# Patient Record
Sex: FEMALE | Race: OTHER | NOT HISPANIC OR LATINO | URBAN - METROPOLITAN AREA
[De-identification: names, ages, dates, MRNs, and addresses within clinical notes are randomized per-mention and may not be internally consistent; named-entity substitution may affect disease eponyms.]

---

## 2018-06-13 ENCOUNTER — EMERGENCY (EMERGENCY)
Facility: HOSPITAL | Age: 43
LOS: 1 days | Discharge: HOME | End: 2018-06-13
Attending: EMERGENCY MEDICINE | Admitting: EMERGENCY MEDICINE
Payer: MEDICAID

## 2018-06-13 VITALS
DIASTOLIC BLOOD PRESSURE: 81 MMHG | RESPIRATION RATE: 18 BRPM | OXYGEN SATURATION: 99 % | TEMPERATURE: 98 F | HEART RATE: 93 BPM | SYSTOLIC BLOOD PRESSURE: 115 MMHG

## 2018-06-13 VITALS
OXYGEN SATURATION: 100 % | DIASTOLIC BLOOD PRESSURE: 76 MMHG | SYSTOLIC BLOOD PRESSURE: 106 MMHG | HEART RATE: 82 BPM | RESPIRATION RATE: 16 BRPM

## 2018-06-13 LAB
ALBUMIN SERPL ELPH-MCNC: 4.1 G/DL — SIGNIFICANT CHANGE UP (ref 3.3–5)
ALP SERPL-CCNC: 39 U/L — LOW (ref 40–120)
ALT FLD-CCNC: 14 U/L — SIGNIFICANT CHANGE UP (ref 4–33)
APTT BLD: 27.3 SEC — LOW (ref 27.5–37.4)
AST SERPL-CCNC: 20 U/L — SIGNIFICANT CHANGE UP (ref 4–32)
BILIRUB SERPL-MCNC: < 0.2 MG/DL — LOW (ref 0.2–1.2)
BUN SERPL-MCNC: 10 MG/DL — SIGNIFICANT CHANGE UP (ref 7–23)
CALCIUM SERPL-MCNC: 9.1 MG/DL — SIGNIFICANT CHANGE UP (ref 8.4–10.5)
CHLORIDE SERPL-SCNC: 101 MMOL/L — SIGNIFICANT CHANGE UP (ref 98–107)
CK MB BLD-MCNC: 1.22 NG/ML — SIGNIFICANT CHANGE UP (ref 1–4.7)
CK MB BLD-MCNC: SIGNIFICANT CHANGE UP (ref 0–2.5)
CK SERPL-CCNC: 72 U/L — SIGNIFICANT CHANGE UP (ref 25–170)
CO2 SERPL-SCNC: 25 MMOL/L — SIGNIFICANT CHANGE UP (ref 22–31)
CREAT SERPL-MCNC: 0.82 MG/DL — SIGNIFICANT CHANGE UP (ref 0.5–1.3)
GLUCOSE SERPL-MCNC: 94 MG/DL — SIGNIFICANT CHANGE UP (ref 70–99)
HCG SERPL-ACNC: < 5 MIU/ML — SIGNIFICANT CHANGE UP
HCT VFR BLD CALC: 38.3 % — SIGNIFICANT CHANGE UP (ref 34.5–45)
HGB BLD-MCNC: 12.5 G/DL — SIGNIFICANT CHANGE UP (ref 11.5–15.5)
INR BLD: 0.93 — SIGNIFICANT CHANGE UP (ref 0.88–1.17)
MCHC RBC-ENTMCNC: 27.9 PG — SIGNIFICANT CHANGE UP (ref 27–34)
MCHC RBC-ENTMCNC: 32.6 % — SIGNIFICANT CHANGE UP (ref 32–36)
MCV RBC AUTO: 85.5 FL — SIGNIFICANT CHANGE UP (ref 80–100)
NRBC # FLD: 0 — SIGNIFICANT CHANGE UP
PLATELET # BLD AUTO: 175 K/UL — SIGNIFICANT CHANGE UP (ref 150–400)
PMV BLD: 11.1 FL — SIGNIFICANT CHANGE UP (ref 7–13)
POTASSIUM SERPL-MCNC: 3.7 MMOL/L — SIGNIFICANT CHANGE UP (ref 3.5–5.3)
POTASSIUM SERPL-SCNC: 3.7 MMOL/L — SIGNIFICANT CHANGE UP (ref 3.5–5.3)
PROT SERPL-MCNC: 7 G/DL — SIGNIFICANT CHANGE UP (ref 6–8.3)
PROTHROM AB SERPL-ACNC: 10.7 SEC — SIGNIFICANT CHANGE UP (ref 9.8–13.1)
RBC # BLD: 4.48 M/UL — SIGNIFICANT CHANGE UP (ref 3.8–5.2)
RBC # FLD: 12.5 % — SIGNIFICANT CHANGE UP (ref 10.3–14.5)
SODIUM SERPL-SCNC: 139 MMOL/L — SIGNIFICANT CHANGE UP (ref 135–145)
TROPONIN T, HIGH SENSITIVITY RESULT: < 6 NG/L — SIGNIFICANT CHANGE UP (ref ?–14)
WBC # BLD: 8.67 K/UL — SIGNIFICANT CHANGE UP (ref 3.8–10.5)
WBC # FLD AUTO: 8.67 K/UL — SIGNIFICANT CHANGE UP (ref 3.8–10.5)

## 2018-06-13 PROCEDURE — 93010 ELECTROCARDIOGRAM REPORT: CPT

## 2018-06-13 PROCEDURE — 71046 X-RAY EXAM CHEST 2 VIEWS: CPT | Mod: 26

## 2018-06-13 PROCEDURE — 99285 EMERGENCY DEPT VISIT HI MDM: CPT | Mod: 25

## 2018-06-13 NOTE — ED PROVIDER NOTE - OBJECTIVE STATEMENT
42 yo female w/o pmhx c/o chest pain and sob. per the patient she has been having persistent sob and chest pain over the past 4 days. the patient states that she has been having sob w/ dyspnea on exertion over the past 4 days. along with that she has chest pain, left sided and left scapular, described as pressure like in sensation. states that the pain gets worse with ambulation and better with rest. denies any cough, rhinorrhea, nasal congestion, n/v/d, headache. does have dizziness, w/ a room spinning sensation after prolonged standing. No abdominal pain. No hx of early MI in her family.

## 2018-06-13 NOTE — ED ADULT NURSE NOTE - OBJECTIVE STATEMENT
patient alert ox3 came in c/o chest pain back pain and unable to catch breath since 4 days.' denies pain now. not in any distress. breathing even and unlabored.  connected to CM shows NSR. labs done as ordered. awaiting results and re eval

## 2018-06-13 NOTE — ED PROVIDER NOTE - MUSCULOSKELETAL, MLM
Spine appears normal, range of motion is not limited, no muscle or joint tenderness. no pitting edema

## 2018-06-13 NOTE — ED PROVIDER NOTE - ATTENDING CONTRIBUTION TO CARE
DR. RUIZ, ATTENDING MD-  I performed a face to face bedside interview with patient regarding history of present illness, review of symptoms and past medical history. I completed an independent physical exam.  I have discussed patient's plan of care with the resident.   Documentation as above in the note.    Yuval: 43 female with no significant PMH c/o ~3 days of dyspnea with associated CPs.  Sx not associated with exertion/rest, occur randomly, with gradual onset and relief over hours.  CP vague, poorly localized, intermittent, with greater pain to upper back.  No h/o DM/htn/hld/cad, no h/o tobacco.  No recent travel, no recent surgery, no OCP use. On exam: very pleasant, comfortable, lungs clear, heart rrr, abd soft nt, ext: no swelling/calf tenderness.  A/P: CP/dyspnea.  history not c/w acs, but given that she is [place] will check high sens troponin (>3hrs from sx onset).  will check cxr.  If trop neg, patient's HEART score would be 0.  I am also not suspecting PE due to her lack of risk factors, as well as the fact that patient is saturating 100% on RA during my exam. DR. RUIZ, ATTENDING MD-  I performed a face to face bedside interview with patient regarding history of present illness, review of symptoms and past medical history. I completed an independent physical exam.  I have discussed patient's plan of care with the resident.   Documentation as above in the note.    Yuval: 43 female with no significant PMH c/o ~3 days of dyspnea with associated CPs.  Sx not associated with exertion/rest, occur randomly, with gradual onset and relief over hours.  CP vague, poorly localized, intermittent, with greater pain to upper back.  No h/o DM/htn/hld/cad, no h/o tobacco.  No recent travel, no recent surgery, no OCP use. On exam: very pleasant, comfortable, lungs clear, heart rrr, abd soft nt, ext: no swelling/calf tenderness.  A/P: CP/dyspnea.  history not c/w acs, but given that she is [place] will check high sens troponin (>3hrs from sx onset).  will check cxr.  If trop neg, patient's HEART score would be 0.  I am also not suspecting PE due to her lack of risk factors, as well as the fact that patient is saturating 100% on RA during my exam.  Sx not acute in onset of "tearing" in nature, and patient has h/o low, not high, BP, equal radial pulses I am not suspecting dissection despite her upper back pain.

## 2018-06-13 NOTE — ED ADULT TRIAGE NOTE - CHIEF COMPLAINT QUOTE
pt presents c/o left sided chest pain radiating to back with episodes of sweating and sob at rest and with exertion. denies any recent travel or long car rides. pt also c/o intermittent dizziness and bump to forehead x 2 weeks ago. denies any additional symptoms. pt reports taking asa 81mg this morning PMHX: low bp

## 2018-06-13 NOTE — ED PROVIDER NOTE - MEDICAL DECISION MAKING DETAILS
44 yo female w/ chest pain. given history and symptoms, consider ACS as more likely. also consider pneumonia vs other pulmonary pathology but lower likely given history and presentation. Will send labs, imaging. EKG NSR at 81.

## 2018-11-17 ENCOUNTER — EMERGENCY (EMERGENCY)
Facility: HOSPITAL | Age: 43
LOS: 1 days | Discharge: HOME | End: 2018-11-17
Attending: EMERGENCY MEDICINE | Admitting: EMERGENCY MEDICINE
Payer: MEDICAID

## 2018-11-17 VITALS
TEMPERATURE: 98 F | HEART RATE: 74 BPM | RESPIRATION RATE: 18 BRPM | DIASTOLIC BLOOD PRESSURE: 70 MMHG | SYSTOLIC BLOOD PRESSURE: 105 MMHG | OXYGEN SATURATION: 98 %

## 2018-11-17 VITALS
DIASTOLIC BLOOD PRESSURE: 73 MMHG | HEART RATE: 80 BPM | OXYGEN SATURATION: 100 % | RESPIRATION RATE: 16 BRPM | TEMPERATURE: 98 F | SYSTOLIC BLOOD PRESSURE: 105 MMHG

## 2018-11-17 LAB
ALBUMIN SERPL ELPH-MCNC: 4 G/DL — SIGNIFICANT CHANGE UP (ref 3.3–5)
ALP SERPL-CCNC: 42 U/L — SIGNIFICANT CHANGE UP (ref 40–120)
ALT FLD-CCNC: 12 U/L — SIGNIFICANT CHANGE UP (ref 4–33)
APPEARANCE UR: CLEAR — SIGNIFICANT CHANGE UP
AST SERPL-CCNC: 16 U/L — SIGNIFICANT CHANGE UP (ref 4–32)
BASE EXCESS BLDV CALC-SCNC: 2.3 MMOL/L — SIGNIFICANT CHANGE UP
BASOPHILS # BLD AUTO: 0.03 K/UL — SIGNIFICANT CHANGE UP (ref 0–0.2)
BASOPHILS NFR BLD AUTO: 0.3 % — SIGNIFICANT CHANGE UP (ref 0–2)
BILIRUB SERPL-MCNC: < 0.2 MG/DL — LOW (ref 0.2–1.2)
BILIRUB UR-MCNC: NEGATIVE — SIGNIFICANT CHANGE UP
BLOOD GAS VENOUS - CREATININE: 0.65 MG/DL — SIGNIFICANT CHANGE UP (ref 0.5–1.3)
BLOOD UR QL VISUAL: NEGATIVE — SIGNIFICANT CHANGE UP
BUN SERPL-MCNC: 8 MG/DL — SIGNIFICANT CHANGE UP (ref 7–23)
CALCIUM SERPL-MCNC: 8.9 MG/DL — SIGNIFICANT CHANGE UP (ref 8.4–10.5)
CHLORIDE BLDV-SCNC: 107 MMOL/L — SIGNIFICANT CHANGE UP (ref 96–108)
CHLORIDE SERPL-SCNC: 101 MMOL/L — SIGNIFICANT CHANGE UP (ref 98–107)
CO2 SERPL-SCNC: 24 MMOL/L — SIGNIFICANT CHANGE UP (ref 22–31)
COLOR SPEC: SIGNIFICANT CHANGE UP
CREAT SERPL-MCNC: 0.68 MG/DL — SIGNIFICANT CHANGE UP (ref 0.5–1.3)
EOSINOPHIL # BLD AUTO: 0.29 K/UL — SIGNIFICANT CHANGE UP (ref 0–0.5)
EOSINOPHIL NFR BLD AUTO: 3.1 % — SIGNIFICANT CHANGE UP (ref 0–6)
GAS PNL BLDV: 135 MMOL/L — LOW (ref 136–146)
GLUCOSE BLDV-MCNC: 100 — HIGH (ref 70–99)
GLUCOSE SERPL-MCNC: 103 MG/DL — HIGH (ref 70–99)
GLUCOSE UR-MCNC: NEGATIVE — SIGNIFICANT CHANGE UP
HCG UR-SCNC: NEGATIVE — SIGNIFICANT CHANGE UP
HCO3 BLDV-SCNC: 24 MMOL/L — SIGNIFICANT CHANGE UP (ref 20–27)
HCT VFR BLD CALC: 37.1 % — SIGNIFICANT CHANGE UP (ref 34.5–45)
HCT VFR BLDV CALC: 39.7 % — SIGNIFICANT CHANGE UP (ref 34.5–45)
HGB BLD-MCNC: 12.2 G/DL — SIGNIFICANT CHANGE UP (ref 11.5–15.5)
HGB BLDV-MCNC: 12.9 G/DL — SIGNIFICANT CHANGE UP (ref 11.5–15.5)
IMM GRANULOCYTES # BLD AUTO: 0.04 # — SIGNIFICANT CHANGE UP
IMM GRANULOCYTES NFR BLD AUTO: 0.4 % — SIGNIFICANT CHANGE UP (ref 0–1.5)
KETONES UR-MCNC: NEGATIVE — SIGNIFICANT CHANGE UP
LACTATE BLDV-MCNC: 1.8 MMOL/L — SIGNIFICANT CHANGE UP (ref 0.5–2)
LEUKOCYTE ESTERASE UR-ACNC: NEGATIVE — SIGNIFICANT CHANGE UP
LYMPHOCYTES # BLD AUTO: 2.53 K/UL — SIGNIFICANT CHANGE UP (ref 1–3.3)
LYMPHOCYTES # BLD AUTO: 27.2 % — SIGNIFICANT CHANGE UP (ref 13–44)
MCHC RBC-ENTMCNC: 27.7 PG — SIGNIFICANT CHANGE UP (ref 27–34)
MCHC RBC-ENTMCNC: 32.9 % — SIGNIFICANT CHANGE UP (ref 32–36)
MCV RBC AUTO: 84.1 FL — SIGNIFICANT CHANGE UP (ref 80–100)
MONOCYTES # BLD AUTO: 0.61 K/UL — SIGNIFICANT CHANGE UP (ref 0–0.9)
MONOCYTES NFR BLD AUTO: 6.6 % — SIGNIFICANT CHANGE UP (ref 2–14)
NEUTROPHILS # BLD AUTO: 5.79 K/UL — SIGNIFICANT CHANGE UP (ref 1.8–7.4)
NEUTROPHILS NFR BLD AUTO: 62.4 % — SIGNIFICANT CHANGE UP (ref 43–77)
NITRITE UR-MCNC: NEGATIVE — SIGNIFICANT CHANGE UP
NRBC # FLD: 0 — SIGNIFICANT CHANGE UP
PCO2 BLDV: 51 MMHG — SIGNIFICANT CHANGE UP (ref 41–51)
PH BLDV: 7.35 PH — SIGNIFICANT CHANGE UP (ref 7.32–7.43)
PH UR: 6 — SIGNIFICANT CHANGE UP (ref 5–8)
PLATELET # BLD AUTO: 176 K/UL — SIGNIFICANT CHANGE UP (ref 150–400)
PMV BLD: 11.1 FL — SIGNIFICANT CHANGE UP (ref 7–13)
PO2 BLDV: 26 MMHG — LOW (ref 35–40)
POTASSIUM BLDV-SCNC: 4.1 MMOL/L — SIGNIFICANT CHANGE UP (ref 3.4–4.5)
POTASSIUM SERPL-MCNC: 4.5 MMOL/L — SIGNIFICANT CHANGE UP (ref 3.5–5.3)
POTASSIUM SERPL-SCNC: 4.5 MMOL/L — SIGNIFICANT CHANGE UP (ref 3.5–5.3)
PROT SERPL-MCNC: 6.5 G/DL — SIGNIFICANT CHANGE UP (ref 6–8.3)
PROT UR-MCNC: NEGATIVE — SIGNIFICANT CHANGE UP
RBC # BLD: 4.41 M/UL — SIGNIFICANT CHANGE UP (ref 3.8–5.2)
RBC # FLD: 12.4 % — SIGNIFICANT CHANGE UP (ref 10.3–14.5)
SAO2 % BLDV: 35.9 % — LOW (ref 60–85)
SODIUM SERPL-SCNC: 137 MMOL/L — SIGNIFICANT CHANGE UP (ref 135–145)
SP GR SPEC: 1.01 — SIGNIFICANT CHANGE UP (ref 1–1.04)
UROBILINOGEN FLD QL: NORMAL — SIGNIFICANT CHANGE UP
WBC # BLD: 9.29 K/UL — SIGNIFICANT CHANGE UP (ref 3.8–10.5)
WBC # FLD AUTO: 9.29 K/UL — SIGNIFICANT CHANGE UP (ref 3.8–10.5)

## 2018-11-17 PROCEDURE — 74177 CT ABD & PELVIS W/CONTRAST: CPT | Mod: 26

## 2018-11-17 PROCEDURE — 99284 EMERGENCY DEPT VISIT MOD MDM: CPT | Mod: 25

## 2018-11-17 NOTE — ED ADULT TRIAGE NOTE - CHIEF COMPLAINT QUOTE
pt. w/ no pmh c/o generalized abd pain radiating ot the back for 2x days. Pt. states she is nauseous but denies vomiting. Pt. states she has been taking Tylenol for pain control , and symptoms have not subside. Pt. appears in NAD in triage

## 2018-11-17 NOTE — ED PROVIDER NOTE - ATTENDING CONTRIBUTION TO CARE
HPI: Pt is a 43 Y F with no pmhx who presents with RLQ pain radiating to her back x 2 days. PT states that her pain is crampy suprapubic radiating around her right side to her right flank, she has mild dysuria. She admits to nausea but she denies vomiting, fever, hematuria. Her LMP was 2 weeks ago and she has an IUD in place, she denies vaginal discharge. She says that she has had UTIs before and this feels somewhat similar. Her only surgical hx was C-sections, she has been having normal BMs and tolerating PO.  EXAM: well appearing, NAD, abd soft nontender, NEG murphys, NEG mcburneys, No cvat. No masses.   MDM: concern for gas pain and constipation, unlikely SBO vs appendicitis vs kidney stone vs pyelo. Will obtain labs, imaging and provide meds and reassess.

## 2018-11-17 NOTE — ED PROVIDER NOTE - MEDICAL DECISION MAKING DETAILS
43 y F with RLQ abdominal pain radiating to the back x 2 days and some dysuria. DDx: Appendicitis, pyelonephritis, cystitis, pancreatitis. Will get labs, CT AP, urinalysis. Dispo pending workup.

## 2018-11-17 NOTE — ED ADULT NURSE NOTE - OBJECTIVE STATEMENT
42 Yo F p/w RLQ pain radiating to her back x 2 days. + nausea - vomiting.  LMP was 2 weeks ago.  normal BMs and tolerating PO.  18R AC

## 2018-11-17 NOTE — ED ADULT NURSE NOTE - CAS ELECT INFOMATION PROVIDED
DC instructions/Patient cleared for discharge by provider.  D/C instructions and heplock removed by provider..

## 2018-11-17 NOTE — ED PROVIDER NOTE - OBJECTIVE STATEMENT
Pt is a 43 Y F with no pmhx who presents with RLQ pain radiating to her back x 2 days. PT states that her pain is crampy suprapubic radiating around her right side to her right flank, she has mild dysuria. She admits to nausea but she denies vomiting, fever, hematuria. Her LMP was 2 weeks ago and she has an IUD in place, she denies vaginal discharge. She says that she has had UTIs before and this feels somewhat similar. Her only surgical hx was C-sections, she has been having normal BMs and tolerating PO.

## 2018-11-17 NOTE — ED PROVIDER NOTE - NSFOLLOWUPINSTRUCTIONS_ED_ALL_ED_FT
If you have any severe increase in pain, fever, uncontrollable nausea vomiting, or inability to tolerate eating and drinking you need to come right back to the emergency room.

## 2018-11-17 NOTE — ED PROVIDER NOTE - PROGRESS NOTE DETAILS
Resident: Huber Fontaine – Pt was re-evaluated at bedside, VSS, feeling better overall. Results were discussed with patient as well as return precautions and follow up plan with PCP and/or specialist. Time was taken to answer any questions that the patient had before providing them with discharge paperwork.

## 2018-11-18 LAB
BACTERIA UR CULT: SIGNIFICANT CHANGE UP
SPECIMEN SOURCE: SIGNIFICANT CHANGE UP

## 2020-01-29 ENCOUNTER — EMERGENCY (EMERGENCY)
Facility: HOSPITAL | Age: 45
LOS: 1 days | Discharge: HOME | End: 2020-01-29
Attending: EMERGENCY MEDICINE | Admitting: EMERGENCY MEDICINE
Payer: MEDICAID

## 2020-01-29 VITALS
HEART RATE: 92 BPM | DIASTOLIC BLOOD PRESSURE: 79 MMHG | OXYGEN SATURATION: 100 % | TEMPERATURE: 98 F | RESPIRATION RATE: 18 BRPM | SYSTOLIC BLOOD PRESSURE: 114 MMHG

## 2020-01-29 DIAGNOSIS — Z98.891 HISTORY OF UTERINE SCAR FROM PREVIOUS SURGERY: Chronic | ICD-10-CM

## 2020-01-29 LAB
APPEARANCE UR: CLEAR — SIGNIFICANT CHANGE UP
BILIRUB UR-MCNC: NEGATIVE — SIGNIFICANT CHANGE UP
BLOOD UR QL VISUAL: SIGNIFICANT CHANGE UP
COLOR SPEC: SIGNIFICANT CHANGE UP
GLUCOSE UR-MCNC: NEGATIVE — SIGNIFICANT CHANGE UP
KETONES UR-MCNC: NEGATIVE — SIGNIFICANT CHANGE UP
LEUKOCYTE ESTERASE UR-ACNC: NEGATIVE — SIGNIFICANT CHANGE UP
NITRITE UR-MCNC: NEGATIVE — SIGNIFICANT CHANGE UP
PH UR: 5.5 — SIGNIFICANT CHANGE UP (ref 5–8)
PROT UR-MCNC: NEGATIVE — SIGNIFICANT CHANGE UP
SP GR SPEC: 1.01 — SIGNIFICANT CHANGE UP (ref 1–1.04)
UROBILINOGEN FLD QL: NORMAL — SIGNIFICANT CHANGE UP

## 2020-01-29 PROCEDURE — 76830 TRANSVAGINAL US NON-OB: CPT | Mod: 26

## 2020-01-29 PROCEDURE — 99284 EMERGENCY DEPT VISIT MOD MDM: CPT

## 2020-01-29 RX ORDER — ACETAMINOPHEN 500 MG
650 TABLET ORAL ONCE
Refills: 0 | Status: COMPLETED | OUTPATIENT
Start: 2020-01-29 | End: 2020-01-29

## 2020-01-29 RX ADMIN — Medication 650 MILLIGRAM(S): at 17:02

## 2020-01-29 NOTE — ED PROVIDER NOTE - PROGRESS NOTE DETAILS
Martínez Hooper D.O., PGY1 (Resident)  Endorsed US findings of hemorrhagic cyst w/o rupture, inplace IUD, and normal UA to patient. Patient feels improved. Return precautions given. OBGYN followup information for patient to call and make an appointment was given.
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
## 2020-01-29 NOTE — ED PROVIDER NOTE - CLINICAL SUMMARY MEDICAL DECISION MAKING FREE TEXT BOX
44 f hx of right ovarian cyst presents to the ED for right sided "ovarian lump" patient felt on self exam. Speculum exam did not show IUD in place, Cervical os did appear slightly open. IUD strings felt on right side of vaginal vault. No discharge noted. Sxs likely related to dislodged IUD vs ruptured ovarian cyst, possible ovarian abscess. Consider urinary infection. Will check UA, upreg, TVUS, treat pain, reassess.

## 2020-01-29 NOTE — ED PROVIDER NOTE - NS ED ROS FT
CONSTITUTIONAL: No fevers, chills, fatigue, dizziness, weakness  EYES: No loss of vision, double vision, blurry vision  CV: No chest pain, palpitations  PULM: No cough, shortness of breath  GI: ABDOMINAL PAIN. No nausea, vomiting, diarrhea, constipation  : VAGINAL PAIN. No dysuria, hematuria, polyuria, vaginal discharge  SKIN: No rashes, swelling  MSK: No muscle aches, joint aches  NEURO: SLIGHT HEADACHE

## 2020-01-29 NOTE — ED PROVIDER NOTE - PATIENT PORTAL LINK FT
You can access the FollowMyHealth Patient Portal offered by [facility] by registering at the following website: [email or web address]. By joining [facility]’s FollowMyHealth portal, you will also be able to view your health information using other applications (apps) compatible with our system.

## 2020-01-29 NOTE — ED PROVIDER NOTE - NSFOLLOWUPINSTRUCTIONS_ED_ALL_ED_FT
-You were seen in the Emergency Department (ED) for _. Lab and imaging results, if performed, were discussed with you along with your discharge diagnosis.    MEDICATIONS:  -You are prescribed _. Please take as directed by your pharmacists.   -Continue all other prescribed medicine, IF ANY, as per your primary care doctor's (PMD) recommendations.    PAIN CONTROL:  -Please take over the counter Tylenol (also known as acetaminophen) 650mg every 6 hours or Ibuprofen (also known as motrin, advil) 400mg every 6 hour for your pain, IF ANY, unless you are not supposed to for any reason.  -Rest, stay hydrated with plenty of fluids (drink at least 2 Liters or 64 Ounces of water each day UNLESS you are supposed to restrict fluids or ANY reason.    FOLLOW-UP:  -*** Please follow up with the OBGYN within the next 1 week. Information to call and make an appointment is provided. ***  -Please follow up with your private physician within the next 72 hours. Tell them you were recently in the ED for an urgent issue and would like to be seen. Bring copies of your results if you were given.   -If you do not have a PMD, please call [phone] to find one convenient for you or call our clinic at [phone].    RETURN PRECAUTIONS:  -Please return to the Emergency Department if you experience ANY new or concerning symptoms, such as, but not limited to: worsening pain, large amount of bleeding, passing out, fever >100.4F, shaking chills, chest pain, difficulty breathing, diffuse abdominal pain, unable to eat or drink, continuous vomiting or diarrhea, new vaginal discharge, worsening urinary symptoms    Thank you for choosing a [facility] ED.

## 2020-01-29 NOTE — ED PROVIDER NOTE - PHYSICAL EXAMINATION
GENERAL: middle aged female, lying in bed, NAD. Vital signs are within normal limits  HEENT: NC/AT, moist mucous membranes  LUNG: CTAB, no w/r/r appreciated, good respiratory effort  CV: RRR, no m/r/g appreciated, Pulses- Radial: 2+ b/l  ABDOMEN: Soft, suprapubic tenderness to palpation. no rebound or guarding,  BACK: No CVA tenderness bilateral  : Exam chaperoned by nurse. Cervix w/o purulence on speculum exam but IDU strings not visualized coming from cervix. IUD strings felt to the right of cervix. No adnexal tenderness, slight CMT.   MSK: No edema, no visible deformities, full range of motion UE/LE b/l, 5/5 muscle strength UE/LE b/l  SKIN: Warm, dry, well perfused, no evidence of rash GENERAL: middle aged female, lying in bed, NAD. Vital signs are within normal limits  HEENT: NC/AT, moist mucous membranes  LUNG: CTAB, no w/r/r appreciated, good respiratory effort  CV: RRR, no m/r/g appreciated, Pulses- Radial: 2+ b/l  ABDOMEN: Soft, suprapubic tenderness to palpation. no rebound or guarding,  BACK: No CVA tenderness bilateral  : Exam chaperoned by nurse. Cervix w/o purulence on speculum exam but IUD strings not visualized coming from cervix. IUD strings felt to the right of cervix. No adnexal tenderness, no CMT.   MSK: No edema, no visible deformities, full range of motion UE/LE b/l, 5/5 muscle strength UE/LE b/l  SKIN: Warm, dry, well perfused, no evidence of rash

## 2020-01-29 NOTE — ED PROVIDER NOTE - ATTENDING CONTRIBUTION TO CARE
DR. FUNK, ATTENDING MD-  I performed a face to face bedside interview with the patient regarding history of present illness, review of symptoms and past medical history. I completed an independent physical exam.  I have discussed the patient's plan of care with the resident.   Documentation as above in the note.    45 y/o female with iud c/o low abd pain.  Right pelvic discomfort.  Will eval for migratory iud, uti, ov cyst.  Obtain tvus, reassess.

## 2020-01-31 LAB
BACTERIA UR CULT: SIGNIFICANT CHANGE UP
SPECIMEN SOURCE: SIGNIFICANT CHANGE UP

## 2021-03-15 NOTE — ED ADULT TRIAGE NOTE - BP NONINVASIVE DIASTOLIC (MM HG)
PLAN FOR DISCHARGE HOME TODAY WITH BOYFRIEND. PT DENIES NEEDS. PER DR CURRY PT HAS CAPACITY TO MAKE HER OWN DECISIONS. LETS GET REAL INFO ON CHART. 81

## 2021-06-17 ENCOUNTER — EMERGENCY (EMERGENCY)
Facility: HOSPITAL | Age: 46
LOS: 1 days | Discharge: HOME | End: 2021-06-17
Attending: PERSONAL EMERGENCY RESPONSE ATTENDANT | Admitting: PERSONAL EMERGENCY RESPONSE ATTENDANT
Payer: COMMERCIAL

## 2021-06-17 VITALS
SYSTOLIC BLOOD PRESSURE: 109 MMHG | RESPIRATION RATE: 18 BRPM | DIASTOLIC BLOOD PRESSURE: 78 MMHG | HEART RATE: 77 BPM | OXYGEN SATURATION: 100 %

## 2021-06-17 VITALS
TEMPERATURE: 98 F | OXYGEN SATURATION: 100 % | DIASTOLIC BLOOD PRESSURE: 83 MMHG | RESPIRATION RATE: 18 BRPM | SYSTOLIC BLOOD PRESSURE: 149 MMHG | HEART RATE: 88 BPM

## 2021-06-17 DIAGNOSIS — Z98.891 HISTORY OF UTERINE SCAR FROM PREVIOUS SURGERY: Chronic | ICD-10-CM

## 2021-06-17 PROBLEM — N83.201 UNSPECIFIED OVARIAN CYST, RIGHT SIDE: Chronic | Status: ACTIVE | Noted: 2020-01-29

## 2021-06-17 PROCEDURE — 72100 X-RAY EXAM L-S SPINE 2/3 VWS: CPT | Mod: 26

## 2021-06-17 PROCEDURE — 72170 X-RAY EXAM OF PELVIS: CPT | Mod: 26

## 2021-06-17 PROCEDURE — 99284 EMERGENCY DEPT VISIT MOD MDM: CPT

## 2021-06-17 RX ORDER — DIAZEPAM 5 MG
2 TABLET ORAL ONCE
Refills: 0 | Status: DISCONTINUED | OUTPATIENT
Start: 2021-06-17 | End: 2021-06-17

## 2021-06-17 RX ORDER — KETOROLAC TROMETHAMINE 30 MG/ML
30 SYRINGE (ML) INJECTION ONCE
Refills: 0 | Status: DISCONTINUED | OUTPATIENT
Start: 2021-06-17 | End: 2021-06-17

## 2021-06-17 RX ORDER — KETOROLAC TROMETHAMINE 30 MG/ML
15 SYRINGE (ML) INJECTION ONCE
Refills: 0 | Status: DISCONTINUED | OUTPATIENT
Start: 2021-06-17 | End: 2021-06-17

## 2021-06-17 RX ADMIN — Medication 30 MILLIGRAM(S): at 18:14

## 2021-06-17 RX ADMIN — Medication 2 MILLIGRAM(S): at 18:14
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
## 2021-06-17 NOTE — ED PROVIDER NOTE - MUSCULOSKELETAL, MLM
R L4-L5 paraspinal tenderness, with no apparent spinal deformity or step offs. neg straight leg raise b/l. Strength 5/5 throughout
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
## 2021-06-17 NOTE — ED PROVIDER NOTE - PATIENT PORTAL LINK FT
You can access the FollowMyHealth Patient Portal offered by [facility] by registering at the following website: [email or web address]. By joining [employer]’s FollowMyHealth portal, you will also be able to view your health information using other applications (apps) compatible with our system.
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
## 2021-09-14 ENCOUNTER — EMERGENCY (EMERGENCY)
Facility: HOSPITAL | Age: 46
LOS: 1 days | Discharge: HOME | End: 2021-09-14
Attending: EMERGENCY MEDICINE | Admitting: EMERGENCY MEDICINE
Payer: MEDICAID

## 2021-09-14 VITALS
TEMPERATURE: 97 F | HEART RATE: 85 BPM | DIASTOLIC BLOOD PRESSURE: 76 MMHG | OXYGEN SATURATION: 99 % | RESPIRATION RATE: 18 BRPM | SYSTOLIC BLOOD PRESSURE: 109 MMHG

## 2021-09-14 DIAGNOSIS — Z98.891 HISTORY OF UTERINE SCAR FROM PREVIOUS SURGERY: Chronic | ICD-10-CM

## 2021-09-14 LAB
ALBUMIN SERPL ELPH-MCNC: 4.4 G/DL — SIGNIFICANT CHANGE UP (ref 3.3–5)
ALP SERPL-CCNC: 37 U/L — LOW (ref 40–120)
ALT FLD-CCNC: 17 U/L — SIGNIFICANT CHANGE UP (ref 4–33)
ANION GAP SERPL CALC-SCNC: 10 MMOL/L — SIGNIFICANT CHANGE UP (ref 7–14)
AST SERPL-CCNC: 21 U/L — SIGNIFICANT CHANGE UP (ref 4–32)
BASOPHILS # BLD AUTO: 0.02 K/UL — SIGNIFICANT CHANGE UP (ref 0–0.2)
BASOPHILS NFR BLD AUTO: 0.2 % — SIGNIFICANT CHANGE UP (ref 0–2)
BILIRUB SERPL-MCNC: 0.2 MG/DL — SIGNIFICANT CHANGE UP (ref 0.2–1.2)
BUN SERPL-MCNC: 18 MG/DL — SIGNIFICANT CHANGE UP (ref 7–23)
CALCIUM SERPL-MCNC: 9.3 MG/DL — SIGNIFICANT CHANGE UP (ref 8.4–10.5)
CHLORIDE SERPL-SCNC: 102 MMOL/L — SIGNIFICANT CHANGE UP (ref 98–107)
CO2 SERPL-SCNC: 21 MMOL/L — LOW (ref 22–31)
CREAT SERPL-MCNC: 0.69 MG/DL — SIGNIFICANT CHANGE UP (ref 0.5–1.3)
EOSINOPHIL # BLD AUTO: 0.18 K/UL — SIGNIFICANT CHANGE UP (ref 0–0.5)
EOSINOPHIL NFR BLD AUTO: 2 % — SIGNIFICANT CHANGE UP (ref 0–6)
GLUCOSE SERPL-MCNC: 91 MG/DL — SIGNIFICANT CHANGE UP (ref 70–99)
HCT VFR BLD CALC: 39 % — SIGNIFICANT CHANGE UP (ref 34.5–45)
HGB BLD-MCNC: 13.1 G/DL — SIGNIFICANT CHANGE UP (ref 11.5–15.5)
IANC: 5.51 K/UL — SIGNIFICANT CHANGE UP (ref 1.5–8.5)
IMM GRANULOCYTES NFR BLD AUTO: 0.2 % — SIGNIFICANT CHANGE UP (ref 0–1.5)
LYMPHOCYTES # BLD AUTO: 2.72 K/UL — SIGNIFICANT CHANGE UP (ref 1–3.3)
LYMPHOCYTES # BLD AUTO: 30.1 % — SIGNIFICANT CHANGE UP (ref 13–44)
MCHC RBC-ENTMCNC: 28.5 PG — SIGNIFICANT CHANGE UP (ref 27–34)
MCHC RBC-ENTMCNC: 33.6 GM/DL — SIGNIFICANT CHANGE UP (ref 32–36)
MCV RBC AUTO: 85 FL — SIGNIFICANT CHANGE UP (ref 80–100)
MONOCYTES # BLD AUTO: 0.58 K/UL — SIGNIFICANT CHANGE UP (ref 0–0.9)
MONOCYTES NFR BLD AUTO: 6.4 % — SIGNIFICANT CHANGE UP (ref 2–14)
NEUTROPHILS # BLD AUTO: 5.51 K/UL — SIGNIFICANT CHANGE UP (ref 1.8–7.4)
NEUTROPHILS NFR BLD AUTO: 61.1 % — SIGNIFICANT CHANGE UP (ref 43–77)
NRBC # BLD: 0 /100 WBCS — SIGNIFICANT CHANGE UP
NRBC # FLD: 0 K/UL — SIGNIFICANT CHANGE UP
PLATELET # BLD AUTO: 188 K/UL — SIGNIFICANT CHANGE UP (ref 150–400)
POTASSIUM SERPL-MCNC: 3.8 MMOL/L — SIGNIFICANT CHANGE UP (ref 3.5–5.3)
POTASSIUM SERPL-SCNC: 3.8 MMOL/L — SIGNIFICANT CHANGE UP (ref 3.5–5.3)
PROT SERPL-MCNC: 7.3 G/DL — SIGNIFICANT CHANGE UP (ref 6–8.3)
RBC # BLD: 4.59 M/UL — SIGNIFICANT CHANGE UP (ref 3.8–5.2)
RBC # FLD: 12.4 % — SIGNIFICANT CHANGE UP (ref 10.3–14.5)
SODIUM SERPL-SCNC: 133 MMOL/L — LOW (ref 135–145)
TROPONIN T, HIGH SENSITIVITY RESULT: <6 NG/L — SIGNIFICANT CHANGE UP
WBC # BLD: 9.03 K/UL — SIGNIFICANT CHANGE UP (ref 3.8–10.5)
WBC # FLD AUTO: 9.03 K/UL — SIGNIFICANT CHANGE UP (ref 3.8–10.5)

## 2021-09-14 PROCEDURE — 71046 X-RAY EXAM CHEST 2 VIEWS: CPT | Mod: 26

## 2021-09-14 PROCEDURE — 99285 EMERGENCY DEPT VISIT HI MDM: CPT | Mod: 25

## 2021-09-14 PROCEDURE — 93010 ELECTROCARDIOGRAM REPORT: CPT

## 2021-09-14 RX ORDER — KETOROLAC TROMETHAMINE 30 MG/ML
15 SYRINGE (ML) INJECTION ONCE
Refills: 0 | Status: DISCONTINUED | OUTPATIENT
Start: 2021-09-14 | End: 2021-09-14

## 2021-09-14 RX ADMIN — Medication 15 MILLIGRAM(S): at 18:39

## 2021-09-14 NOTE — ED PROVIDER NOTE - CLINICAL SUMMARY MEDICAL DECISION MAKING FREE TEXT BOX
47 yo F no sig pmh presents with left arm pain, chest pain.  VSS AF.  Exam with no focal neuro deficit, well perfused extremities.  Suspect lue sx 2/2 known radiculopathy given outpatient workup.  Low concern for acute coronary syndrome as chest pain non-exertional and there is no nausea or diaphoresis.  Low concern for aortic dissection as chest pain non-acute onset, not radiating to back, not described as "tearing", no pulse or neuro deficit on exam.  Low concern for pulmonary embolism, patient is PERC negative with low pre-test probability of PE.  EKG nsr with pac, isolated twi in v2.  Plan for labs, cxr, supportive care.  Dispo pending.

## 2021-09-14 NOTE — ED PROVIDER NOTE - PATIENT PORTAL LINK FT
You can access the FollowMyHealth Patient Portal offered by [facility] by registering at the following website: [email or web address]. By joining [facility]’s FollowMyHealth portal, you will also be able to view your health information using other applications (apps) compatible with our system.

## 2021-09-14 NOTE — ED PROVIDER NOTE - PHYSICAL EXAMINATION
GEN:  Non-toxic appearing, non-distressed, speaking full sentences, non-diaphoretic, AAOx3  HEENT:  NCAT, neck supple, EOMI, PERRLA, sclera anicteric, no conjunctival pallor or injection, no stridor, normal voice, no tonsillar exudate, uvula midline  CV:  regular rhythm and rate, s1/s2 audible, no murmurs, rubs or gallops, peripheral pulses 2+ and symmetric  PULM:  non-labored respirations, lungs clear to auscultation bilaterally, no wheezes, crackles or rales  ABD:  non distended, non-tender, no rebound, no guarding, negative Pleitez's sign, bowel sounds normal, no cvat  MSK:  no gross deformity, non-tender extremities and joints, range of motion grossly normal appearing, no extremity edema, extremities warm and well perfused   NEURO:  AAOx3, CN II-XII intact, motor 5/5 in upper and lower extremities bilaterally, sensation grossly intact in extremities and trunk, finger to nose testing wnl, no nystagmus, negative Romberg, no pronator drift, no gait deficit  SKIN:  warm, dry, no rash or vesicles

## 2021-09-14 NOTE — ED ADULT TRIAGE NOTE - CHIEF COMPLAINT QUOTE
Pt c/o pain to left chest, left shoulder and left arm with numbness to fingers intermittently for 2 weeks. Denies SOB, denies N/V, denies dizziness.
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
## 2021-09-14 NOTE — ED PROVIDER NOTE - WORK/EXCUSE FORM DATE
Recorded as Task  Date: 07/18/2017 01:37 PM, Created By: British Leanna  Task Name: 4. Patient Message  Assigned To: LEONARD RAMACHANDRAN  Regarding Patient: RICH SHAFER, Status: Active  Comment:   British Leanna - 18 Jul 2017 1:37 PM    Appointment for Urgent Symptom  \"Patient declined ACC RN Triage.   The patient made a reference to the following symptom:   Symptom: Confusion > Confusion NOT new    Action: URGENT   PCP: LEONARD RAMACHANDRAN MD     APPOINTMENT DATE/TIME:  07/22/17 12:40 PM      COMMENTS:  Patient is experiencing confusion. An appointment has been  scheduled. Patients daughter isnt for sure if its a side effect from  the patients medication. Daughter is also returning a call from the  doctor. Daughter would like to speak with Dr. Ramachandran.       CALLER'S RELATIONSHIP TO PATIENT:  Daughter   IF OTHER, NAME AND RELATIONSHIP:  Iza      BEST NUMBER TO BE CONTACTED:  [phone]   ALTERNATIVE PHONE NUMBER:       Turnaround time given to caller:     ?THIS MESSAGE WILL BE SENT TO YOUR PROVIDER.\"\"      READ BACK MESSAGE TO PATIENT\"  Lakeshia Garcia - 18 Jul 2017 1:39 PM    UNDELEGATED TASK  LEONARD RAMACHANDRAN - 18 Jul 2017 2:01 PM    TASK EDITED  daughter, Iza, expresses concern re his worsening memory and confusion  Plan: will see him next wk. I asked if she or the wife can come with.      Electronically signed by:LEONARD RAMACHANDRAN MD  Jul 18 2017  2:02PM CST     14-Sep-2021

## 2021-09-14 NOTE — ED ADULT NURSE NOTE - OBJECTIVE STATEMENT
Pt c/o pain to left chest, left shoulder and left arm with numbness to fingers intermittently for 2 weeks. Denies SOB, denies N/V, denies dizziness. 20 g line placed in RT AC, labs sent. in NAd.

## 2021-12-16 NOTE — ED ADULT TRIAGE NOTE - PRO INTERPRETER NEED 2
Detail Level: Simple
Price (Do Not Change): 0.00
Instructions: This plan will send the code FBSE to the PM system.  DO NOT or CHANGE the price.
English

## 2022-01-26 ENCOUNTER — APPOINTMENT (OUTPATIENT)
Dept: OBGYN | Facility: CLINIC | Age: 47
End: 2022-01-26
Payer: MEDICAID

## 2022-01-26 VITALS
DIASTOLIC BLOOD PRESSURE: 81 MMHG | WEIGHT: 146 LBS | HEART RATE: 97 BPM | SYSTOLIC BLOOD PRESSURE: 115 MMHG | BODY MASS INDEX: 26.87 KG/M2 | HEIGHT: 62 IN

## 2022-01-26 DIAGNOSIS — Z01.411 ENCOUNTER FOR GYNECOLOGICAL EXAMINATION (GENERAL) (ROUTINE) WITH ABNORMAL FINDINGS: ICD-10-CM

## 2022-01-26 DIAGNOSIS — Z11.3 ENCOUNTER FOR SCREENING FOR INFECTIONS WITH A PREDOMINANTLY SEXUAL MODE OF TRANSMISSION: ICD-10-CM

## 2022-01-26 DIAGNOSIS — R10.2 PELVIC AND PERINEAL PAIN: ICD-10-CM

## 2022-01-26 PROCEDURE — 99386 PREV VISIT NEW AGE 40-64: CPT

## 2022-01-27 LAB
BACTERIA UR CULT: NORMAL
C TRACH RRNA SPEC QL NAA+PROBE: NOT DETECTED
HBV SURFACE AG SER QL: NONREACTIVE
HCV AB SER QL: NONREACTIVE
HCV S/CO RATIO: 0.1 S/CO
HIV1+2 AB SPEC QL IA.RAPID: NONREACTIVE
N GONORRHOEA RRNA SPEC QL NAA+PROBE: NOT DETECTED
SOURCE AMPLIFICATION: NORMAL
T PALLIDUM AB SER QL IA: NEGATIVE

## 2022-01-28 LAB — HPV HIGH+LOW RISK DNA PNL CVX: NOT DETECTED

## 2022-01-31 LAB — CYTOLOGY CVX/VAG DOC THIN PREP: NORMAL

## 2022-02-11 ENCOUNTER — APPOINTMENT (OUTPATIENT)
Dept: OBGYN | Facility: CLINIC | Age: 47
End: 2022-02-11
Payer: MEDICAID

## 2022-02-11 ENCOUNTER — ASOB RESULT (OUTPATIENT)
Age: 47
End: 2022-02-11

## 2022-02-11 VITALS
HEART RATE: 84 BPM | WEIGHT: 146 LBS | SYSTOLIC BLOOD PRESSURE: 116 MMHG | HEIGHT: 62 IN | BODY MASS INDEX: 26.87 KG/M2 | DIASTOLIC BLOOD PRESSURE: 75 MMHG

## 2022-02-11 DIAGNOSIS — N94.6 DYSMENORRHEA, UNSPECIFIED: ICD-10-CM

## 2022-02-11 PROCEDURE — 99214 OFFICE O/P EST MOD 30 MIN: CPT

## 2022-02-11 PROCEDURE — 76830 TRANSVAGINAL US NON-OB: CPT

## 2022-02-22 ENCOUNTER — APPOINTMENT (OUTPATIENT)
Dept: ULTRASOUND IMAGING | Facility: IMAGING CENTER | Age: 47
End: 2022-02-22

## 2022-02-22 ENCOUNTER — APPOINTMENT (OUTPATIENT)
Dept: MAMMOGRAPHY | Facility: IMAGING CENTER | Age: 47
End: 2022-02-22

## 2022-03-15 ENCOUNTER — APPOINTMENT (OUTPATIENT)
Dept: OBGYN | Facility: HOSPITAL | Age: 47
End: 2022-03-15

## 2022-04-06 ENCOUNTER — RX RENEWAL (OUTPATIENT)
Age: 47
End: 2022-04-06

## 2022-05-06 ENCOUNTER — APPOINTMENT (OUTPATIENT)
Dept: OBGYN | Facility: CLINIC | Age: 47
End: 2022-05-06
Payer: MEDICAID

## 2022-05-06 VITALS
HEART RATE: 85 BPM | WEIGHT: 146 LBS | HEIGHT: 62 IN | SYSTOLIC BLOOD PRESSURE: 103 MMHG | BODY MASS INDEX: 26.87 KG/M2 | DIASTOLIC BLOOD PRESSURE: 74 MMHG

## 2022-05-06 PROCEDURE — 99213 OFFICE O/P EST LOW 20 MIN: CPT

## 2022-05-06 RX ORDER — NORETHINDRONE ACETATE AND ETHINYL ESTRADIOL 1; 20 MG/1; UG/1
1-20 TABLET ORAL DAILY
Qty: 3 | Refills: 0 | Status: DISCONTINUED | COMMUNITY
Start: 2022-02-11 | End: 2022-05-06

## 2022-05-06 RX ORDER — NORETHINDRONE ACETATE AND ETHINYL ESTRADIOL 1.5; 3 MG/1; UG/1
1.5-3 TABLET ORAL
Qty: 3 | Refills: 3 | Status: ACTIVE | COMMUNITY
Start: 2022-05-06 | End: 1900-01-01

## 2022-05-06 RX ORDER — NORETHINDRONE ACETATE/ETHINYL ESTRADIOL 1MG-20MCG
1-20 KIT ORAL
Qty: 3 | Refills: 3 | Status: DISCONTINUED | COMMUNITY
Start: 2022-04-06 | End: 2022-05-06

## 2022-05-12 ENCOUNTER — EMERGENCY (EMERGENCY)
Facility: HOSPITAL | Age: 47
LOS: 1 days | Discharge: HOME | End: 2022-05-12
Admitting: EMERGENCY MEDICINE
Payer: COMMERCIAL

## 2022-05-12 VITALS
DIASTOLIC BLOOD PRESSURE: 75 MMHG | RESPIRATION RATE: 16 BRPM | OXYGEN SATURATION: 100 % | SYSTOLIC BLOOD PRESSURE: 112 MMHG | HEART RATE: 88 BPM | TEMPERATURE: 98 F

## 2022-05-12 DIAGNOSIS — Z98.891 HISTORY OF UTERINE SCAR FROM PREVIOUS SURGERY: Chronic | ICD-10-CM

## 2022-05-12 LAB — BACTERIA UR CULT: NORMAL

## 2022-05-12 PROCEDURE — 72100 X-RAY EXAM L-S SPINE 2/3 VWS: CPT | Mod: 26

## 2022-05-12 PROCEDURE — 73130 X-RAY EXAM OF HAND: CPT | Mod: 26,RT

## 2022-05-12 PROCEDURE — 72040 X-RAY EXAM NECK SPINE 2-3 VW: CPT | Mod: 26

## 2022-05-12 PROCEDURE — 99284 EMERGENCY DEPT VISIT MOD MDM: CPT

## 2022-05-12 RX ORDER — IBUPROFEN 200 MG
600 TABLET ORAL ONCE
Refills: 0 | Status: COMPLETED | OUTPATIENT
Start: 2022-05-12 | End: 2022-05-12

## 2022-05-12 RX ADMIN — Medication 600 MILLIGRAM(S): at 21:00
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
## 2022-05-12 NOTE — ED PROVIDER NOTE - PATIENT PORTAL LINK FT
You can access the FollowMyHealth Patient Portal offered by [facility] by registering at the following website: [email or web address]. By joining [employer]’s FollowMyHealth portal, you will also be able to view your health information using other applications (apps) compatible with our system.
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
## 2022-07-31 NOTE — ED ADULT NURSE NOTE - CCCP TRG CHIEF CMPLNT
Pediatric H&P Note    History obtained from: Mother  Live video/phone  service used? No    History Of Present Illness:    Ayniece is a 25 month old female presenting with 1 day of increased work of breathing and subjective fever. Mom states patient was at baseline health yesterday until overnight when aunt noticed belly breathing and tactile fever. Aunt gave albuterol nebulizer at that time but patient continued to have increased work of breathing. This morning, mother saw patient and brought her to the ED. Mom states patient with poor PO intake today, 1 wet diaper. No sick contacts but does attend . No vomiting. No new rashes.    Age of diagnosis? 22 month old   Home medications?        Controller -   Flovent 44 mcg 2 puff BID      Rescue -    Albuterol 4 puffs every 4-6 hours PRN  Triggers?  Viral illness  Frequency of exacerbations?  Last exacerbation in 2/2022, admitted to [facility]    Hospital admissions?  2/2022, given albuterol at that time  PICU admissions?  None  Intubations?   None  Pets?  None  Smoke exposure?  None  Eczema?  No history  Allergies?  No history     ED Course:  Patient arrived to the ED febrile to 38.4, tachycardic and tachypneic. Received 1 hour long (15 mg albuterol, atrovent) with improved aeration and wheezing, continued work of breathing. Placed on HFNC 1L/kg given persistent tachypnea. Found to be RSV positive. Albuterol spaced to q2h. Tylenol given for fevers. No PIV placed.    Floor Course:  Patient arrived to the floor on 1L/kg HFNC 30% FiO2. Last received albuterol ~1 hour prior to transfer to the floor. Active on exam.     Past Medical History   has a past medical history of Asthma, Bronchiolitis (10/21/2021), Eczema, Multiple excoriations (06/29/2021), Plagiocephaly, acquired (2020), and RSV infection (07/31/2022).    She has no past medical history of ADHD (attention deficit hyperactivity disorder), Allergy, Concussion, Diabetes mellitus (CMS/[facility]),  Encephalopathy chronic, Gastroenteritis, Head ache, Hearing loss, Heart murmur, HIV disease (CMS/HCC), Jaundice, Lead poisoning, Malignant neoplasm (CMS/HCC), Meningitis, Noninfectious ileitis, Obese, Otitis media, Pneumonia, RAD (reactive airway disease), Reduced vision, Scoliosis, Seizures (CMS/HCC), Sickle cell anemia (CMS/HCC), Strep throat, Urinary tract infection, or Varicella.    Birth History  Birth History   • Birth     Length: 19\" (48.3 cm)     Weight: 2.52 kg (5 lb 8.9 oz)   • Delivery Method: Vaginal, Spontaneous   • Gestation Age: 40 wks   • Feeding: Breast and Bottle Fed   • Days in Hospital: 2.0   • [facility]     No problems        Surgical History   has no past surgical history on file.     Social History  Lives at home with Mom, Dad, 7 month old brother  Recent travel: None  Sick contacts: None, but in     Family History  Dad, M Grandmother with asthma. No family history of eczema.     Allergies  ALLERGIES:  Patient has no known allergies.    Medications  Medications Prior to Admission   Medication Sig Dispense Refill   • fluticasone propionate (Flovent HFA) 44 MCG/ACT inhaler Inhale 2 puffs into the lungs 2 times daily. 10.6 g 3   • acetaminophen (TYLENOL) 160 MG/5ML suspension Take 5.7 mLs by mouth every 6 hours as needed for Fever.     • albuterol 108 (90 Base) MCG/ACT inhaler Inhale 4 puffs into the lungs every 4 hours as needed for Shortness of Breath or Wheezing. 1 each 1   • Spacer/Aero-Holding Chambers (AeroChamber MV) Misc Use with Inhaler 1 each 1       Immunizations  [facility]    Outpatient Pediatrician  Matthew Vernon MD      ROS  Review of Systems   Constitutional: Positive for activity change, appetite change and fever.   HENT: Positive for congestion and rhinorrhea.    Respiratory: Positive for cough and wheezing. Negative for apnea.    Cardiovascular: Negative for cyanosis.   Gastrointestinal: Negative for abdominal pain, constipation, diarrhea, nausea and vomiting.  chest pain   DATA:    Admission on 07/31/2022   Component Date Value Ref Range Status   • Rapid SARS-COV-2 by PCR 07/31/2022 Not Detected  Not Detected / Detected / Presumptive Positive / Inhibitors present Final   • Influenza A by PCR 07/31/2022 Not Detected  Not Detected Final   • Influenza B by PCR 07/31/2022 Not Detected  Not Detected Final   • RSV BY PCR 07/31/2022 Detected (A) Not Detected Final   • Isolation Guidelines 07/31/2022    Final    Do not use this test result as the sole decision-maker for discontinuation of isolation.   Clinical evaluation should be considered for other respiratory illness requiring transmission-based isolation.    -    No fever (<99.0 F/37.2 C) for at least 24 hours without the use of fever-reducing medications    AND  -    Respiratory symptoms have improved or resolved (e.g. cough, shortness of breath)     AND  -    COVID-19 negative test    See COVID-19 Deisolation Resource Guide   • Procedural Comment 07/31/2022    Final    SARS-COV-2 nucleic acid has not been detected indicating the absence of COVID-19.    This test was performed using the [employer] Xpert Xpress SARS-CoV-2/Flu/RSV RT-PCR test that has been given Emergency Use Authorization (EUA) by the [place] Food and Drug Administration (FDA).  These tests are considered definitive and do not need to be confirmed by another method.         IMAGING STUDIES:    No orders to display        ASSESSMENT:   Principal Problem:    RSV infection      Active Problems: Patient is a 25-month old female with a past medical history of mild persistent asthma who presents the ED with increased work of breathing, fevers, congestion and rhinorrhea x1 day, found to be RSV positive.  Patient placed on high flow nasal cannula and spaced to q2h albuterol prior to transfer to the floor.  We will continue to monitor, and wean high flow nasal cannula and space albuterol, as tolerated.    1.) RSV bronchiolitis   2.) Asthma exacerbation 2/2 viral  Armenta)    Marian Kruger DO   7/31/2022

## 2022-08-26 ENCOUNTER — APPOINTMENT (OUTPATIENT)
Dept: OBGYN | Facility: CLINIC | Age: 47
End: 2022-08-26

## 2022-10-24 NOTE — ED PROVIDER NOTE - NSFOLLOWUPINSTRUCTIONS_ED_ALL_ED_FT
Virtual f/u is fine.   1.  Please take tylenol 500 mg every 6-8 hours as needed for pain.   2.  Please return to care for worsening chest pain, nausea, shortness of breath, arm pain, arm numbness.   3.  Follow with your primary care doctor as early as able.

## 2022-12-04 ENCOUNTER — EMERGENCY (EMERGENCY)
Facility: HOSPITAL | Age: 47
LOS: 1 days | Discharge: HOME | End: 2022-12-04
Attending: EMERGENCY MEDICINE | Admitting: EMERGENCY MEDICINE

## 2022-12-04 VITALS
SYSTOLIC BLOOD PRESSURE: 104 MMHG | DIASTOLIC BLOOD PRESSURE: 61 MMHG | RESPIRATION RATE: 18 BRPM | HEART RATE: 103 BPM | OXYGEN SATURATION: 100 % | TEMPERATURE: 99 F

## 2022-12-04 VITALS
OXYGEN SATURATION: 100 % | TEMPERATURE: 99 F | DIASTOLIC BLOOD PRESSURE: 73 MMHG | HEART RATE: 92 BPM | SYSTOLIC BLOOD PRESSURE: 101 MMHG | RESPIRATION RATE: 18 BRPM

## 2022-12-04 DIAGNOSIS — Z98.891 HISTORY OF UTERINE SCAR FROM PREVIOUS SURGERY: Chronic | ICD-10-CM

## 2022-12-04 LAB
APPEARANCE UR: CLEAR — SIGNIFICANT CHANGE UP
BACTERIA # UR AUTO: NEGATIVE — SIGNIFICANT CHANGE UP
BILIRUB UR-MCNC: NEGATIVE — SIGNIFICANT CHANGE UP
COLOR SPEC: SIGNIFICANT CHANGE UP
DIFF PNL FLD: ABNORMAL
EPI CELLS # UR: 2 /HPF — SIGNIFICANT CHANGE UP (ref 0–5)
GLUCOSE UR QL: NEGATIVE — SIGNIFICANT CHANGE UP
HYALINE CASTS # UR AUTO: 2 /LPF — SIGNIFICANT CHANGE UP (ref 0–7)
KETONES UR-MCNC: NEGATIVE — SIGNIFICANT CHANGE UP
LEUKOCYTE ESTERASE UR-ACNC: NEGATIVE — SIGNIFICANT CHANGE UP
NITRITE UR-MCNC: NEGATIVE — SIGNIFICANT CHANGE UP
PH UR: 7 — SIGNIFICANT CHANGE UP (ref 5–8)
PROT UR-MCNC: ABNORMAL
RBC CASTS # UR COMP ASSIST: 2 /HPF — SIGNIFICANT CHANGE UP (ref 0–4)
SP GR SPEC: 1.02 — SIGNIFICANT CHANGE UP (ref 1.01–1.05)
UROBILINOGEN FLD QL: SIGNIFICANT CHANGE UP
WBC UR QL: 1 /HPF — SIGNIFICANT CHANGE UP (ref 0–5)

## 2022-12-04 PROCEDURE — 99284 EMERGENCY DEPT VISIT MOD MDM: CPT

## 2022-12-04 RX ORDER — CEFTRIAXONE 500 MG/1
500 INJECTION, POWDER, FOR SOLUTION INTRAMUSCULAR; INTRAVENOUS ONCE
Refills: 0 | Status: COMPLETED | OUTPATIENT
Start: 2022-12-04 | End: 2022-12-04

## 2022-12-04 RX ORDER — METRONIDAZOLE 500 MG
1 TABLET ORAL
Qty: 20 | Refills: 0
Start: 2022-12-04 | End: 2022-12-13

## 2022-12-04 RX ORDER — METRONIDAZOLE 500 MG
500 TABLET ORAL ONCE
Refills: 0 | Status: COMPLETED | OUTPATIENT
Start: 2022-12-04 | End: 2022-12-04

## 2022-12-04 RX ORDER — FLUCONAZOLE 150 MG/1
150 TABLET ORAL ONCE
Refills: 0 | Status: COMPLETED | OUTPATIENT
Start: 2022-12-04 | End: 2022-12-04

## 2022-12-04 RX ADMIN — CEFTRIAXONE 500 MILLIGRAM(S): 500 INJECTION, POWDER, FOR SOLUTION INTRAMUSCULAR; INTRAVENOUS at 17:52

## 2022-12-04 RX ADMIN — Medication 100 MILLIGRAM(S): at 18:29

## 2022-12-04 RX ADMIN — FLUCONAZOLE 150 MILLIGRAM(S): 150 TABLET ORAL at 18:29

## 2022-12-04 RX ADMIN — Medication 500 MILLIGRAM(S): at 18:29

## 2022-12-04 NOTE — ED ADULT TRIAGE NOTE - ARRIVAL FROM
+sob/cp/cough  all others negative except as per hpi +sob/cough/chest pain  all others negative except as per hpi Home

## 2022-12-04 NOTE — ED ADULT TRIAGE NOTE - CHIEF COMPLAINT QUOTE
c/o burning on urination, pain, and foul smelling discharge, also endorses B/l flank pain and fever. denies PMHx.,

## 2022-12-04 NOTE — ED PROVIDER NOTE - PHYSICAL EXAMINATION
Vital signs: Mild tachycardia appreciated, otherwise within normal limits  General adult female no acute distress.  Normocephalic/atraumatic  Pelvic exam: Chaperone PCA Din Din  normal external female genitalia,   +whitish dishcargne, positive cervical motion tenderness and vaginal tenderness on exam.  No adnexal TTP  Abdomen: Mild suprapubic tenderness to palpation

## 2022-12-04 NOTE — ED PROVIDER NOTE - NSFOLLOWUPINSTRUCTIONS_ED_ALL_ED_FT
Pelvic Inflammatory Disease    Pelvic inflammatory disease (PID) is an infection in some or all of the female reproductive organs. PID can be in the womb (uterus), ovaries, fallopian tubes, or nearby tissues that are inside the lower belly area (pelvis). PID can lead to problems if it is not treated.      What are the causes?  •Germs (bacteria) that are spread during sex. This is the most common cause.  •Germs in the vagina that are not spread during sex.   •Germs that travel up from the vagina or cervix to the reproductive organs after:  •The birth of a baby.  •A miscarriage.  •An .  •Pelvic surgery.  •Insertion of an intrauterine device (IUD).  •A sexual assault.      What increases the risk?  •Being younger than 25 years old.  •Having sex at a young age.  •Having a history of STI (sexually transmitted infection) or PID.  •Not using barrier birth control, such as condoms.  •Having a lot of sex partners.  •Having sex with someone who has symptoms of an STI.  •Using a douche.  •Having an IUD put in place.    What are the signs or symptoms?  •Pain in the belly area.  •Fever.  •Chills.    •Discharge from the vagina that is not normal.  •Bleeding from the womb that is not normal.  •Pain soon after the end of a menstrual period.  •Pain when you pee (urinate).  •Pain with sex.  •Feeling sick to your stomach (nauseous) or throwing up (vomiting).    How is this treated?  •Efforts to stop the spread of the infection. Sex partners may need to be treated.    It may take weeks until you feel all better. Your doctor may test you for infection again after you finish treatment. You should also be checked for HIV (human immunodeficiency virus).    Follow these instructions at home:  •Take over-the-counter and prescription medicines only as told by your doctor.  •If you were prescribed an antibiotic medicine, take it as told by your doctor. Do not stop taking it even if you start to feel better.  • Do not have sex until treatment is done or as told by your doctor.  •Tell your sex partner if you have PID. Your partner may need to be treated.  •Keep all follow-up visits as told by your doctor. This is important.    Contact a doctor if:  •You have more fluid or fluid that is not normal coming from your vagina.  •Your pain does not improve.  •You throw up.  •You have a fever.  •You cannot take your medicines.  •Your partner has an STI.  •You have pain when you pee.    Get help right away if:  •You have more pain in the belly area.  •You have chills.  •You are not better in 72 hours with treatment.    TAKE DOXYCYLINE 100MG, twice/day, for the next 14 days. Take metronidazole 500mg twice/day for the next 14 days.

## 2022-12-04 NOTE — ED PROVIDER NOTE - PATIENT PORTAL LINK FT
You can access the FollowMyHealth Patient Portal offered by [facility] by registering at the following website: [email or web address]. By joining [employer]’s FollowMyHealth portal, you will also be able to view your health information using other applications (apps) compatible with our system.

## 2022-12-04 NOTE — ED PROVIDER NOTE - OBJECTIVE STATEMENT
47-year-old female complaining of dysuria and pelvic pain.  Duration: 3 days   Associated symptoms no nausea vomiting, no back pain, no fever chills  Modifiers: None

## 2022-12-04 NOTE — ED PROVIDER NOTE - CLINICAL SUMMARY MEDICAL DECISION MAKING FREE TEXT BOX
Impression: H&P most consistent with PID and/or UTI.  Plan: Empiric treatment ceftriaxone, doxycycline, Flagyl.

## 2022-12-05 LAB
C TRACH RRNA SPEC QL NAA+PROBE: SIGNIFICANT CHANGE UP
CULTURE RESULTS: SIGNIFICANT CHANGE UP
N GONORRHOEA RRNA SPEC QL NAA+PROBE: SIGNIFICANT CHANGE UP
SPECIMEN SOURCE: SIGNIFICANT CHANGE UP
SPECIMEN SOURCE: SIGNIFICANT CHANGE UP

## 2023-05-10 ENCOUNTER — APPOINTMENT (OUTPATIENT)
Dept: OBGYN | Facility: CLINIC | Age: 48
End: 2023-05-10

## 2023-10-04 ENCOUNTER — APPOINTMENT (OUTPATIENT)
Dept: OBGYN | Facility: CLINIC | Age: 48
End: 2023-10-04
Payer: MEDICAID

## 2023-10-04 VITALS
HEART RATE: 90 BPM | SYSTOLIC BLOOD PRESSURE: 95 MMHG | WEIGHT: 150 LBS | HEIGHT: 62 IN | DIASTOLIC BLOOD PRESSURE: 69 MMHG | BODY MASS INDEX: 27.6 KG/M2

## 2023-10-04 DIAGNOSIS — R92.2 INCONCLUSIVE MAMMOGRAM: ICD-10-CM

## 2023-10-04 DIAGNOSIS — R92.30 INCONCLUSIVE MAMMOGRAM: ICD-10-CM

## 2023-10-04 DIAGNOSIS — Z01.419 ENCOUNTER FOR GYNECOLOGICAL EXAMINATION (GENERAL) (ROUTINE) W/OUT ABNORMAL FINDINGS: ICD-10-CM

## 2023-10-04 PROCEDURE — 99396 PREV VISIT EST AGE 40-64: CPT

## 2023-10-05 LAB
C TRACH RRNA SPEC QL NAA+PROBE: NOT DETECTED
HBV SURFACE AG SER QL: NONREACTIVE
HCV AB SER QL: NONREACTIVE
HCV S/CO RATIO: 0.07 S/CO
HIV1+2 AB SPEC QL IA.RAPID: NONREACTIVE
HPV HIGH+LOW RISK DNA PNL CVX: NOT DETECTED
N GONORRHOEA RRNA SPEC QL NAA+PROBE: NOT DETECTED
SOURCE AMPLIFICATION: NORMAL
T PALLIDUM AB SER QL IA: NEGATIVE

## 2023-10-09 ENCOUNTER — APPOINTMENT (OUTPATIENT)
Dept: OBGYN | Facility: CLINIC | Age: 48
End: 2023-10-09
Payer: MEDICAID

## 2023-10-09 ENCOUNTER — ASOB RESULT (OUTPATIENT)
Age: 48
End: 2023-10-09

## 2023-10-09 LAB — CYTOLOGY CVX/VAG DOC THIN PREP: NORMAL

## 2023-10-09 PROCEDURE — 76830 TRANSVAGINAL US NON-OB: CPT

## 2023-10-18 ENCOUNTER — APPOINTMENT (OUTPATIENT)
Dept: OBGYN | Facility: CLINIC | Age: 48
End: 2023-10-18
Payer: MEDICAID

## 2023-10-18 VITALS
DIASTOLIC BLOOD PRESSURE: 66 MMHG | HEART RATE: 83 BPM | BODY MASS INDEX: 27.42 KG/M2 | WEIGHT: 149 LBS | SYSTOLIC BLOOD PRESSURE: 94 MMHG | HEIGHT: 62 IN

## 2023-10-18 DIAGNOSIS — Z87.898 PERSONAL HISTORY OF OTHER SPECIFIED CONDITIONS: ICD-10-CM

## 2023-10-18 DIAGNOSIS — R30.0 DYSURIA: ICD-10-CM

## 2023-10-18 LAB
HCG UR QL: NEGATIVE
QUALITY CONTROL: YES

## 2023-10-18 PROCEDURE — 99213 OFFICE O/P EST LOW 20 MIN: CPT

## 2023-10-20 ENCOUNTER — NON-APPOINTMENT (OUTPATIENT)
Age: 48
End: 2023-10-20

## 2023-10-23 LAB — BACTERIA UR CULT: NORMAL

## 2024-02-22 DIAGNOSIS — D21.9 BENIGN NEOPLASM OF CONNECTIVE AND OTHER SOFT TISSUE, UNSPECIFIED: ICD-10-CM

## 2024-03-14 ENCOUNTER — APPOINTMENT (OUTPATIENT)
Dept: OBGYN | Facility: CLINIC | Age: 49
End: 2024-03-14
Payer: MEDICAID

## 2024-03-14 ENCOUNTER — ASOB RESULT (OUTPATIENT)
Age: 49
End: 2024-03-14

## 2024-03-14 VITALS
BODY MASS INDEX: 28.89 KG/M2 | HEIGHT: 62 IN | DIASTOLIC BLOOD PRESSURE: 80 MMHG | HEART RATE: 98 BPM | SYSTOLIC BLOOD PRESSURE: 112 MMHG | WEIGHT: 157 LBS

## 2024-03-14 DIAGNOSIS — N39.3 STRESS INCONTINENCE (FEMALE) (MALE): ICD-10-CM

## 2024-03-14 DIAGNOSIS — N60.02 SOLITARY CYST OF LEFT BREAST: ICD-10-CM

## 2024-03-14 PROCEDURE — 76830 TRANSVAGINAL US NON-OB: CPT

## 2024-03-14 PROCEDURE — 99213 OFFICE O/P EST LOW 20 MIN: CPT

## 2024-03-14 PROCEDURE — 99459 PELVIC EXAMINATION: CPT

## 2024-03-20 NOTE — DISCUSSION/SUMMARY
[FreeTextEntry1] : This note was written by Debra Jackson on 03/14/2024  actively solely DEE Albert M.D.   All medical record entries made by this scribe at my, DEE Boyd M.D direction and personally dictated by me on 03/14/2024 . I have personally reviewed the chart and agree that the record reflects my personal performance of the history, physical exam, assessment, and plan.

## 2024-03-20 NOTE — PHYSICAL EXAM
[Chaperone Present] : A chaperone was present in the examining room during all aspects of the physical examination [FreeTextEntry1] :  GINA Brooks  [Appropriately responsive] : appropriately responsive [Alert] : alert [No Acute Distress] : no acute distress [No Lymphadenopathy] : no lymphadenopathy [Regular Rate Rhythm] : regular rate rhythm [No Murmurs] : no murmurs [Clear to Auscultation B/L] : clear to auscultation bilaterally [Soft] : soft [Non-tender] : non-tender [Non-distended] : non-distended [No HSM] : No HSM [No Lesions] : no lesions [Oriented x3] : oriented x3 [No Mass] : no mass [Labia Majora] : normal [Labia Minora] : normal [Normal] : normal [Uterine Adnexae] : normal

## 2024-03-20 NOTE — PLAN
[FreeTextEntry1] : pelvic pain has significant pelvic organ prolapse patient interested in pessary use will refer to urogyn

## 2024-03-20 NOTE — HISTORY OF PRESENT ILLNESS
[FreeTextEntry1] : 49 year old pt presents for a follow up appointment regarding lower abdominal pain. Pt reports she feels like something is coming out of her vagina. Pt has h/o fibroid uterus and adenoid myosis. Pt's uterus measured 10cm in 10/23, and measured 10cm today, unchanged. Pt has a 2cm cyst on the right ovary and 3.3cm cyst on the left ovary. It was noted on exam that her cervix is coming down to the introitus. Pt also reports urinary urgency.

## 2024-07-10 ENCOUNTER — APPOINTMENT (OUTPATIENT)
Dept: UROGYNECOLOGY | Facility: CLINIC | Age: 49
End: 2024-07-10
Payer: MEDICAID

## 2024-07-10 VITALS
BODY MASS INDEX: 27.6 KG/M2 | WEIGHT: 150 LBS | OXYGEN SATURATION: 100 % | DIASTOLIC BLOOD PRESSURE: 61 MMHG | HEART RATE: 78 BPM | HEIGHT: 62 IN | SYSTOLIC BLOOD PRESSURE: 91 MMHG

## 2024-07-10 DIAGNOSIS — N32.81 OVERACTIVE BLADDER: ICD-10-CM

## 2024-07-10 DIAGNOSIS — N81.2 INCOMPLETE UTEROVAGINAL PROLAPSE: ICD-10-CM

## 2024-07-10 DIAGNOSIS — N81.6 RECTOCELE: ICD-10-CM

## 2024-07-10 DIAGNOSIS — R10.2 PELVIC AND PERINEAL PAIN: ICD-10-CM

## 2024-07-10 LAB
BILIRUB UR QL STRIP: NORMAL
CLARITY UR: CLEAR
COLLECTION METHOD: NORMAL
GLUCOSE UR-MCNC: NORMAL
HCG UR QL: 0.2 EU/DL
HGB UR QL STRIP.AUTO: ABNORMAL
KETONES UR-MCNC: NORMAL
LEUKOCYTE ESTERASE UR QL STRIP: NORMAL
NITRITE UR QL STRIP: NORMAL
PH UR STRIP: 5.5
PROT UR STRIP-MCNC: NORMAL
SP GR UR STRIP: >=1.03

## 2024-07-10 PROCEDURE — 81003 URINALYSIS AUTO W/O SCOPE: CPT | Mod: QW

## 2024-07-10 PROCEDURE — 99459 PELVIC EXAMINATION: CPT

## 2024-07-10 PROCEDURE — 99215 OFFICE O/P EST HI 40 MIN: CPT | Mod: 25

## 2024-07-10 PROCEDURE — 99205 OFFICE O/P NEW HI 60 MIN: CPT | Mod: 25

## 2024-07-10 PROCEDURE — 51701 INSERT BLADDER CATHETER: CPT

## 2024-07-10 RX ORDER — MIRABEGRON 50 MG/1
50 TABLET, EXTENDED RELEASE ORAL
Qty: 30 | Refills: 0 | Status: ACTIVE | COMMUNITY
Start: 2024-07-10 | End: 1900-01-01

## 2024-07-12 DIAGNOSIS — Z82.49 FAMILY HISTORY OF ISCHEMIC HEART DISEASE AND OTHER DISEASES OF THE CIRCULATORY SYSTEM: ICD-10-CM

## 2024-07-12 DIAGNOSIS — Z82.3 FAMILY HISTORY OF STROKE: ICD-10-CM

## 2024-07-13 LAB — BACTERIA UR CULT: NORMAL

## 2024-07-16 ENCOUNTER — NON-APPOINTMENT (OUTPATIENT)
Age: 49
End: 2024-07-16

## 2024-07-16 LAB
APPEARANCE: CLEAR
BACTERIA: NEGATIVE /HPF
BILIRUBIN URINE: NEGATIVE
BLOOD URINE: NEGATIVE
CALCIUM OXALATE CRYSTALS: PRESENT
CAST: 1 /LPF
COLOR: YELLOW
EPITHELIAL CELLS: 6 /HPF
GLUCOSE QUALITATIVE U: NEGATIVE MG/DL
KETONES URINE: NEGATIVE MG/DL
LEUKOCYTE ESTERASE URINE: NEGATIVE
MICROSCOPIC-UA: NORMAL
MUCUS: PRESENT
NITRITE URINE: NEGATIVE
PH URINE: 6
PROTEIN URINE: NORMAL MG/DL
RED BLOOD CELLS URINE: 1 /HPF
REVIEW: NORMAL
SPECIFIC GRAVITY URINE: 1.03
UROBILINOGEN URINE: 0.2 MG/DL

## 2024-08-15 ENCOUNTER — APPOINTMENT (OUTPATIENT)
Dept: UROGYNECOLOGY | Facility: CLINIC | Age: 49
End: 2024-08-15

## 2025-04-10 ENCOUNTER — RESULT REVIEW (OUTPATIENT)
Age: 50
End: 2025-04-10

## 2025-04-10 ENCOUNTER — APPOINTMENT (OUTPATIENT)
Dept: ULTRASOUND IMAGING | Facility: IMAGING CENTER | Age: 50
End: 2025-04-10

## 2025-04-10 ENCOUNTER — OUTPATIENT (OUTPATIENT)
Dept: OUTPATIENT SERVICES | Facility: HOSPITAL | Age: 50
LOS: 1 days | End: 2025-04-10
Payer: MEDICAID

## 2025-04-10 DIAGNOSIS — R10.2 PELVIC AND PERINEAL PAIN: ICD-10-CM

## 2025-04-10 DIAGNOSIS — R30.0 DYSURIA: ICD-10-CM

## 2025-04-15 DIAGNOSIS — N20.9 URINARY CALCULUS, UNSPECIFIED: ICD-10-CM

## 2025-04-15 DIAGNOSIS — N20.0 CALCULUS OF KIDNEY: ICD-10-CM
